# Patient Record
Sex: MALE | Race: WHITE | Employment: STUDENT | URBAN - METROPOLITAN AREA
[De-identification: names, ages, dates, MRNs, and addresses within clinical notes are randomized per-mention and may not be internally consistent; named-entity substitution may affect disease eponyms.]

---

## 2017-06-30 ENCOUNTER — THERAPY VISIT (OUTPATIENT)
Dept: CHIROPRACTIC MEDICINE | Facility: CLINIC | Age: 22
End: 2017-06-30
Payer: COMMERCIAL

## 2017-06-30 DIAGNOSIS — M25.561 RIGHT KNEE PAIN: ICD-10-CM

## 2017-06-30 DIAGNOSIS — M25.551 HIP PAIN, RIGHT: ICD-10-CM

## 2017-06-30 DIAGNOSIS — M99.05 SOMATIC DYSFUNCTION OF PELVIS REGION: Primary | ICD-10-CM

## 2017-06-30 DIAGNOSIS — M79.10 MYALGIA: ICD-10-CM

## 2017-06-30 PROCEDURE — 99203 OFFICE O/P NEW LOW 30 MIN: CPT | Performed by: CHIROPRACTOR

## 2017-06-30 ASSESSMENT — ACTIVITIES OF DAILY LIVING (ADL)
STIFFNESS: I HAVE THE SYMPTOM BUT IT DOES NOT AFFECT MY ACTIVITY
PAIN: THE SYMPTOM AFFECTS MY ACTIVITY MODERATELY
SWELLING: THE SYMPTOM AFFECTS MY ACTIVITY MODERATELY
WEAKNESS: THE SYMPTOM AFFECTS MY ACTIVITY SLIGHTLY
LIMPING: THE SYMPTOM AFFECTS MY ACTIVITY SLIGHTLY
GIVING WAY, BUCKLING OR SHIFTING OF KNEE: I DO NOT HAVE THE SYMPTOM

## 2017-06-30 NOTE — PROGRESS NOTES
[facility]  Jun 30, 2017    Subjective:  Wei Alfaro   21 year old   male    CC: Right posterior knee pain, R posterior hip pain,    Medications reviewed: allergy medication   Visit: 1/6  Goal: play high level baseball, decrease pain 50%, sit for 30 minutes without hip pain, run 30 minutes without knee pain  Location: Right posterior knee, R Posterior hip   VEL: chronic pain since 6/2016  Pain: 3/10 on average  Previous History: had some back pain in past when younger, notes pain in hip and knee for over 1 years, previous labral tear and shoulder managed conservatively   Progression: nothing has helped over last year   Quality: ache in knee, ache and snapping in hip   Radiation: denies  Pain is worse with: running, squatting, sitting for long periods  Pain is better with: rehab, rest  Timing: frequent pain   Under care: worked with PT for left shoulder, has worked with ATC at school for hip and knee. Things help for short term.   Imaging: MRI of knee was (-) and WNL according to him and father  Social: alert, oriented, and active.  at [place]       Objective:  Inspection:  No SDD  No Scars  Normal Gait  Right foot flare     Palpation:  No specific pain  Palpable soreness over R posterior knee, R posterior hip  Myofascitis 2/4 noted over R popliteus, R hip ER, R distal hamstring    ROM:  Lumbar flexion   90/90  Lumbar extension  30/30, mild lower back discomfort  Right Hip IR  2/45, post 16  Left Hip IR  32/45  Right Hip ER  58/60  Left  hip ER  52/60  Ankle DF limited B    MMT:  Left glute med 5/5 with no pain  Right glute med 4/5 with no pain  Left TFL 5/5 with no pain  Right TFL 4/5 with no pain  Left piriformis 5/5 with no pain  Right piriformis 4/5 with no pain    MET:  Right short leg  Right superior pubic bone  Right hip out flare    Squat:  Shift to right  Foot flare to right  Arm drop on left    Lunge:  Right knee genu valgum  Right knee cross over      NAL:  Restricted SI on right side    Neuro:  Able to toe walk and heel walk  L4-S1 light touch WNL     Ortho:  SLR (-), fader (pinching right hip), liliam (-)  Assessment:  NAL with associated myofascitis and weakness  Hip pain, discussed potential labral pathology and AURELIA  Knee pain    Plan:   Decrease pain 50%    Restore symmetric hip IR   Restore symmetric hip ER   Strengthen hip stabilizers to 5/5 B   Restore pelvic leveling   Restore segmental motion   Functional goals in history    Patient was given detailed history, review of symptoms, examination, functional examination, and report of findings. After this patient was treated with chiropractic adjustments, manual therapy, and therapeutic exercise. Patient tolerated treatment well. Patients treatment plan with be 2 times per week for 2 weeks followed by 1 time per week for 2 weeks. Following treatment plan a follow up exam will be done to make sure patient is improving. Treatment frequency will degrease as patients subjective complaints improve as well as objective findings. Prognosis for care is good based on fact that patient is active and is willing to take active approach in care.     Patient tolerated treatment well today  Treatment Time: 45 minutes  09805 manipulation 1-2 segments: MET, Hip LAD  17902 manipulation: Manual extremity  17583 Manual therapy: (ART, Graston, Strain Counter Strain, Fascial Manipulation, Cupping) performed over area of R hip ER, R VL, R popliteus   28704 therapeutic exercise (20 minutes): hip reverse clams X40 yellow band, side lying hip abduction   Strapping: hip IR and inferior distraction   Taping:  Next visit: couple weeks after summer baseball  Level 3 exam based on complexity       Donald Live DC, MEd, ATC

## 2017-08-01 ENCOUNTER — THERAPY VISIT (OUTPATIENT)
Dept: CHIROPRACTIC MEDICINE | Facility: CLINIC | Age: 22
End: 2017-08-01
Payer: COMMERCIAL

## 2017-08-01 DIAGNOSIS — M99.05 SOMATIC DYSFUNCTION OF PELVIS REGION: Primary | ICD-10-CM

## 2017-08-01 DIAGNOSIS — M79.10 MYALGIA: ICD-10-CM

## 2017-08-01 DIAGNOSIS — G89.29 CHRONIC PAIN OF RIGHT KNEE: ICD-10-CM

## 2017-08-01 DIAGNOSIS — M25.551 HIP PAIN, RIGHT: ICD-10-CM

## 2017-08-01 DIAGNOSIS — M25.561 CHRONIC PAIN OF RIGHT KNEE: ICD-10-CM

## 2017-08-01 PROCEDURE — 97110 THERAPEUTIC EXERCISES: CPT | Performed by: CHIROPRACTOR

## 2017-08-01 PROCEDURE — 98940 CHIROPRACT MANJ 1-2 REGIONS: CPT | Mod: AT | Performed by: CHIROPRACTOR

## 2017-08-01 NOTE — PROGRESS NOTES
[facility]  Jun 30, 2017    Subjective:  Wei Alfaro   21 year old   male    CC: Right posterior knee pain, R posterior hip pain,    Medications reviewed: allergy medication   Visit: 1/6  Goal: play high level baseball, decrease pain 50%, sit for 30 minutes without hip pain, run 30 minutes without knee pain  Location: Right posterior knee, R Posterior hip   VEL: chronic pain since 6/2016  Pain: 3/10 on average  Previous History: had some back pain in past when younger, notes pain in hip and knee for over 1 years, previous labral tear and shoulder managed conservatively   Progression: nothing has helped over last year   Quality: ache in knee, ache and snapping in hip   Radiation: denies  Pain is worse with: running, squatting, sitting for long periods  Pain is better with: rehab, rest  Timing: frequent pain   Under care: worked with PT for left shoulder, has worked with ATC at school for hip and knee. Things help for short term.   Imaging: MRI of knee was (-) and WNL according to him and father  Social: alert, oriented, and active.  at [employer]     Comes in today doing doing OK. Notes last time doing exercises 4-5 times per week. Notes running early on was pain free. I sent his ATC a letter with what we found but has not had any treatment since last session. He denies any new issues and was pleased with first session.       Objective:  Inspection:  No SDD  No Scars  Normal Gait  Right foot flare     Palpation:  No specific pain  Palpable soreness over R posterior knee, R posterior hip  Myofascitis 2/4 noted over R popliteus, R hip ER, R distal hamstring    ROM:  Lumbar flexion   90/90  Lumbar extension  30/30, mild lower back discomfort  Right Hip IR  2/45, post 16  Left Hip IR  32/45  Right Hip ER  58/60  Left  hip ER  52/60  Ankle DF limited B    MMT:  Left glute med 5/5 with no pain  Right glute med 4/5 with no pain  Left TFL 5/5 with no pain  Right TFL 4/5 with  no pain  Left piriformis 5/5 with no pain  Right piriformis 4/5 with no pain    MET:  Right short leg  Right superior pubic bone  Right hip out flare    Squat:  Shift to right  Foot flare to right  Arm drop on left    Lunge:  Right knee genu valgum  Right knee cross over     NAL:  Restricted SI on right side    Ortho:  SLR (-), fader (pinching right hip), liliam (-)  Assessment:  NAL with associated myofascitis and weakness  Hip pain, discussed potential labral pathology and AURELIA  Knee pain    Plan:  Patient tolerated treatment well today  Treatment Time: 45 minutes  45682 manipulation 1-2 segments: MET, Hip LAD  84351 manipulation: Manual extremity  50392 Manual therapy: (ART, Graston, Strain Counter Strain, Fascial Manipulation, Cupping) performed over area of R hip ER, R VL, R popliteus   09050 therapeutic exercise (20 minutes): hip reverse clams X40 yellow band, side lying hip abduction, straight leg marching bridges, hot salsa reach lunges   Strapping: hip IR and inferior distraction   Taping:  Next visit: 1 week  Dry Needle: R popliteus, R posterior hip (tolerated well)      Donald Live DC, MEd, ATC

## 2017-08-01 NOTE — MR AVS SNAPSHOT
After Visit Summary   8/1/2017    Wei Alfaro    MRN: [id number]           Patient Information     Date Of Birth          1995        Visit Information        Provider Department      8/1/2017 4:00 PM Donald Live DC [facility] [place] [facility]        Today's Diagnoses     Somatic dysfunction of pelvis region    -  1    Hip pain, right        Chronic pain of right knee        Myalgia           Follow-ups after your visit        Your next 10 appointments already scheduled     Aug 08, 2017  8:00 AM CDT   KARINA Chiropractor with Donald Live DC   [facility] [place] [facility] ([facility] [place])    [address] [place] [address]  [address]  [place] [address]   [phone]            Aug 11, 2017  8:00 AM CDT   KARINA Chiropractor with Donald Live DC   [facility] [place] [facility] ([facility] [place])    [address] [place] [address]  [address]  [place] [address]   [phone]            Aug 15, 2017  1:45 PM CDT   KARINA Chiropractor with Donald Live DC   [facility] [place] [facility] ([facility] [place])    [address] [place] [address]  [address]  [place] [address]   [phone]            Aug 18, 2017  1:00 PM CDT   KARINA Chiropractor with Donald Live DC   [facility] [place] [facility] ([facility] [place])    [address] [place] [address]  [address]  [place] [address]   [phone]            Aug 21, 2017  8:00 AM CDT   KARINA Chiropractor with Donald Live DC   [facility] [place] [facility] ([facility] [place])    [address] [place] [address]  [address]  [place] [address]   [phone]            Aug 25, 2017  8:00 AM CDT   KARINA Chiropractor with Donald Live DC   [facility] [place] [facility] ([facility] [place])    [address] [place] [address]  [address]  [place] [address] [phone]               Who to contact     If you have questions or need follow up information about today's clinic visit or your schedule please contact [facility] directly at [phone].  Normal or non-critical lab and imaging results will be communicated to you by MyChart, letter or phone within 4 business days after the clinic has received the results. If you do not hear from us within 7 days, please contact the clinic through MyChart or phone. If you have a critical or abnormal lab result, we will notify you by phone as soon as possible.  Submit refill requests through [email or web address] or call your pharmacy and they will forward the refill request to us. Please allow 3 business days for your refill to be completed.          Additional Information About Your Visit        [employer] Information     [email or web address] gives you secure access to your electronic health record. If you see a primary care provider, you can also send messages to your care team and make appointments. If you have questions, please call your primary care clinic.  If you do not have a primary care provider, please call [phone] and they will assist you.        Care EveryWhere ID     This is your Care EveryWhere ID. This could be used by other organizations to access your [facility] medical records  [id number]         Blood Pressure from Last 3 Encounters:   08/08/16 125/77   08/11/14 122/68   03/29/11 105/60    Weight from Last 3 Encounters:   08/08/16 100.2 kg (221 lb) (97 %)*   11/06/14 84.4 kg (186 lb) (89 %)*   08/11/14 84.4 kg (186 lb) (90 %)*     * Growth percentiles are based on CDC 2-20 Years data.              We Performed the Following     CHIROPRAC MANIP,SPINAL,1-2 REGIONS     THERAPEUTIC EXERCISES        Primary Care Provider    None Specified       No primary provider on file.        Equal Access to Services     STEPHANE VERDUGO : susu Lassiter qaybta kaalmada adeegyada, waxay idiin  clary anilvictor manuel satyajoan layuliaharjit ah. So [facility] [phone].    ATENCIÓN: Si habla mikiañol, tiene a mcpherson disposición servicios gratuitos de asistencia lingüística. Guerda al [phone].    We comply with applicable federal civil rights laws and [place] laws. We do not discriminate on the basis of race, color, national origin, age, disability sex, sexual orientation or gender identity.            Thank you!     Thank you for choosing [facility] CHIROPRACTOR  for your care. Our goal is always to provide you with excellent care. Hearing back from our patients is one way we can continue to improve our services. Please take a few minutes to complete the written survey that you may receive in the mail after your visit with us. Thank you!             Your Updated Medication List - Protect others around you: Learn how to safely use, store and throw away your medicines at www.disposemymeds.org.          This list is accurate as of: 8/1/17 11:59 PM.  Always use your most recent med list.                   Brand Name Dispense Instructions for use Diagnosis    CLARITIN-D 12 HOUR 5-120 MG per 12 hr tablet   Generic drug:  loratadine-pseudoePHEDrine      None Entered        NASONEX 50 MCG/ACT spray   Generic drug:  mometasone      None Entered        PATANOL OP           SINGULAIR 10 MG tablet   Generic drug:  montelukast      None Entered

## 2017-08-08 ENCOUNTER — THERAPY VISIT (OUTPATIENT)
Dept: CHIROPRACTIC MEDICINE | Facility: CLINIC | Age: 22
End: 2017-08-08
Payer: COMMERCIAL

## 2017-08-08 DIAGNOSIS — M79.10 MYALGIA: ICD-10-CM

## 2017-08-08 DIAGNOSIS — M25.551 HIP PAIN, RIGHT: ICD-10-CM

## 2017-08-08 DIAGNOSIS — M99.05 SOMATIC DYSFUNCTION OF PELVIS REGION: Primary | ICD-10-CM

## 2017-08-08 DIAGNOSIS — M25.561 CHRONIC PAIN OF RIGHT KNEE: ICD-10-CM

## 2017-08-08 DIAGNOSIS — G89.29 CHRONIC PAIN OF RIGHT KNEE: ICD-10-CM

## 2017-08-08 PROCEDURE — 98940 CHIROPRACT MANJ 1-2 REGIONS: CPT | Mod: AT | Performed by: CHIROPRACTOR

## 2017-08-08 PROCEDURE — 97110 THERAPEUTIC EXERCISES: CPT | Performed by: CHIROPRACTOR

## 2017-08-08 NOTE — MR AVS SNAPSHOT
After Visit Summary   8/8/2017    Wei Alfaro    MRN: [id number]           Patient Information     Date Of Birth          1995        Visit Information        Provider Department      8/8/2017 8:00 AM Donald Live DC [facility] [place] [facility]rop[facility]        Today's Diagnoses     Somatic dysfunction of pelvis region    -  1    Hip pain, right        Chronic pain of right knee        Myalgia           Follow-ups after your visit        Your next 10 appointments already scheduled     Aug 11, 2017  8:00 AM CDT   KARINA Chiropractor with Donald Live DC   [facility] [place] [facility] ([facility] [place])    [address] [place] [address]  [address]  [place] [address]   [phone]            Aug 15, 2017  1:45 PM CDT   KARINA Chiropractor with Donald Live DC   [facility] [place] [facility] ([facility] [place])    [address] [place] [address]  [address]  [place] [address]   [phone]            Aug 18, 2017  1:00 PM CDT   KARINA Chiropractor with Donald Live DC   [facility] [place] [facility] ([facility] [place])    [address] [facility]  [address]  [place] [address]   [phone]            Aug 21, 2017  8:00 AM CDT   KARINA Chiropractor with Donald Live DC   [facility] [place] [facility] ([facility] [place])    [address] [place] [address]  [address]  [place] [address]   [phone]            Aug 25, 2017  8:00 AM CDT   KARINA Chiropractor with Donald Live DC   [facility] [place] [facility] ([facility] [place])    [address] [place] [address]  [address]  [place] [address]   [phone]              Who to contact     If you have questions or need follow up information about today's clinic visit or your schedule please contact [facility] directly at [phone].  Normal or  non-critical lab and imaging results will be communicated to you by MyChart, letter or phone within 4 business days after the clinic has received the results. If you do not hear from us within 7 days, please contact the clinic through [employer] or phone. If you have a critical or abnormal lab result, we will notify you by phone as soon as possible.  Submit refill requests through [employer] or call your pharmacy and they will forward the refill request to us. Please allow 3 business days for your refill to be completed.          Additional Information About Your Visit        [employer] Information     [employer] gives you secure access to your electronic health record. If you see a primary care provider, you can also send messages to your care team and make appointments. If you have questions, please call your primary care clinic.  If you do not have a primary care provider, please call [phone] and they will assist you.        Care EveryWhere ID     This is your Care EveryWhere ID. This could be used by other organizations to access your [place] medical records  [id number]         Blood Pressure from Last 3 Encounters:   08/08/16 125/77   08/11/14 122/68   03/29/11 105/60    Weight from Last 3 Encounters:   08/08/16 100.2 kg (221 lb) (97 %)*   11/06/14 84.4 kg (186 lb) (89 %)*   08/11/14 84.4 kg (186 lb) (90 %)*     * Growth percentiles are based on [facility] 2-20 Years data.              We Performed the Following     CHIROPRAC MANIP,SPINAL,1-2 REGIONS     THERAPEUTIC EXERCISES        Primary Care Provider    None Specified       No primary provider on file.        Equal Access to Services     [facility] Center[facility] : Hadrobert Sprague, wanyasiada lushyann, qaybta kaalmada sindhu, paulette gardner . So [facility] [phone].    ATENCIÓN: Si habla español, tiene a mcpherson disposición servicios gratuitos de asistencia lingüística. Llame al [phone].    We comply with applicable federal civil rights laws  and [place] laws. We do not discriminate on the basis of race, color, national origin, age, disability sex, sexual orientation or gender identity.            Thank you!     Thank you for choosing [facility]  for your care. Our goal is always to provide you with excellent care. Hearing back from our patients is one way we can continue to improve our services. Please take a few minutes to complete the written survey that you may receive in the mail after your visit with us. Thank you!             Your Updated Medication List - Protect others around you: Learn how to safely use, store and throw away your medicines at www.disposemymeds.org.          This list is accurate as of: 8/8/17  9:35 AM.  Always use your most recent med list.                   Brand Name Dispense Instructions for use Diagnosis    CLARITIN-D 12 HOUR 5-120 MG per 12 hr tablet   Generic drug:  loratadine-pseudoePHEDrine      None Entered        NASONEX 50 MCG/ACT spray   Generic drug:  mometasone      None Entered        PATANOL OP           SINGULAIR 10 MG tablet   Generic drug:  montelukast      None Entered

## 2017-08-08 NOTE — PROGRESS NOTES
[facility]  Jun 30, 2017    Subjective:  Wei Alfaro   21 year old   male    CC: Right posterior knee pain, R posterior hip pain,    Medications reviewed: allergy medication   Visit: 3/6  Goal: play high level baseball, decrease pain 50%, sit for 30 minutes without hip pain, run 30 minutes without knee pain  Location: Right posterior knee, R Posterior hip   VEL: chronic pain since 6/2016  Pain: 3/10 on average  Previous History: had some back pain in past when younger, notes pain in hip and knee for over 1 years, previous labral tear and shoulder managed conservatively   Progression: nothing has helped over last year   Quality: ache in knee, ache and snapping in hip   Radiation: denies  Pain is worse with: running, squatting, sitting for long periods  Pain is better with: rehab, rest  Timing: frequent pain   Under care: worked with PT for left shoulder, has worked with ATC at school for hip and knee. Things help for short term.   Imaging: MRI of knee was (-) and WNL according to him and father  Social: alert, oriented, and active.  at [employer]   Comes in today doing great. Notes improvement. Working on active care and pleased with progress.       Objective:  Inspection:  No SDD  No Scars  Normal Gait  Right foot flare     Palpation:  No specific pain  Palpable soreness over R posterior knee, R posterior hip  Myofascitis 2/4 noted over R popliteus, R hip ER, R distal hamstring    ROM:  Lumbar flexion   90/90  Lumbar extension  30/30, mild lower back discomfort  Right Hip IR  2/45, post 27  Left Hip IR  32/45  Right Hip ER  58/60  Left  hip ER  52/60  Ankle DF limited B    MMT:  Left glute med 5/5 with no pain  Right glute med 4/5 with no pain  Left TFL 5/5 with no pain  Right TFL 4/5 with no pain  Left piriformis 5/5 with no pain  Right piriformis 4/5 with no pain    MET:  Right short leg  Right superior pubic bone  Right hip out flare    Squat:  Shift to  right  Foot flare to right  Arm drop on left    Lunge:  Right knee genu valgum  Right knee cross over     NAL:  Restricted SI on right side    Ortho:  SLR (-), fader (pinching right hip), liliam (-)  Assessment:  NAL with associated myofascitis and weakness  Hip pain, discussed potential labral pathology and AURELIA  Knee pain    Plan:  Patient tolerated treatment well today  Treatment Time: 45 minutes  57677 manipulation 1-2 segments: MET, Hip LAD  25829 manipulation: Manual extremity  06659 Manual therapy: (ART, Graston, Strain Counter Strain, Fascial Manipulation, Cupping) performed over area of R hip ER, R VL, R popliteus   22706 therapeutic exercise (20 minutes): hip reverse clams X40 yellow band, side lying hip abduction, straight leg marching bridges, hot salsa reach lunges   Strapping: hip IR and inferior distraction, kneeling posterior glide back and forth, figure 4 lateral glide  Taping:  Next visit: 1 week  Dry Needle: R popliteus, R posterior hip (tolerated well)      Donald Live DC, MEd, ATC

## 2017-08-11 ENCOUNTER — THERAPY VISIT (OUTPATIENT)
Dept: CHIROPRACTIC MEDICINE | Facility: CLINIC | Age: 22
End: 2017-08-11
Payer: COMMERCIAL

## 2017-08-11 DIAGNOSIS — G89.29 CHRONIC PAIN OF RIGHT KNEE: ICD-10-CM

## 2017-08-11 DIAGNOSIS — M99.05 SOMATIC DYSFUNCTION OF PELVIS REGION: Primary | ICD-10-CM

## 2017-08-11 DIAGNOSIS — M79.10 MYALGIA: ICD-10-CM

## 2017-08-11 DIAGNOSIS — M25.561 CHRONIC PAIN OF RIGHT KNEE: ICD-10-CM

## 2017-08-11 DIAGNOSIS — M25.551 HIP PAIN, RIGHT: ICD-10-CM

## 2017-08-11 PROCEDURE — 98940 CHIROPRACT MANJ 1-2 REGIONS: CPT | Mod: AT | Performed by: CHIROPRACTOR

## 2017-08-11 PROCEDURE — 97110 THERAPEUTIC EXERCISES: CPT | Performed by: CHIROPRACTOR

## 2017-08-11 NOTE — PROGRESS NOTES
[facility] [facility]  Jun 30, 2017    Subjective:  Wei Alfaro   21 year old   male    CC: Right posterior knee pain, R posterior hip pain,    Medications reviewed: allergy medication   Visit: 4/6  Goal: play high level baseball, decrease pain 50%, sit for 30 minutes without hip pain, run 30 minutes without knee pain  Location: Right posterior knee, R Posterior hip   VEL: chronic pain since 6/2016  Pain: 3/10 on average  Previous History: had some back pain in past when younger, notes pain in hip and knee for over 1 years, previous labral tear and shoulder managed conservatively   Progression: nothing has helped over last year   Quality: ache in knee, ache and snapping in hip   Radiation: denies  Pain is worse with: running, squatting, sitting for long periods  Pain is better with: rehab, rest  Timing: frequent pain   Under care: worked with PT for left shoulder, has worked with ATC at school for hip and knee. Things help for short term.   Imaging: MRI of knee was (-) and WNL according to him and father  Social: alert, oriented, and active.  at [employer]   Comes in today doing well. Notes that working on hip mobility at home. Denies any new issues. Working out and running 2-4 miles. Notes knee and hips is doing well. Working on active care program. Very pleased with progress. Denies any new issues.       Objective:  Inspection:  No SDD  No Scars  Normal Gait  Right foot flare     Palpation:  No specific pain  Palpable soreness over R posterior knee, R posterior hip  Myofascitis 2/4 noted over R popliteus, R hip ER, R distal hamstring    ROM:  Lumbar flexion   90/90  Lumbar extension  30/30, mild lower back discomfort  Right Hip IR  2/45, post 27  Left Hip IR  32/45  Right Hip ER  58/60  Left  hip ER  52/60  Ankle DF limited B    MMT:  Left glute med 5/5 with no pain  Right glute med 4/5 with no pain  Left TFL 5/5 with no pain  Right TFL 4/5 with no pain  Left piriformis 5/5  with no pain  Right piriformis 4/5 with no pain    MET:  Right short leg  Right superior pubic bone  Right hip out flare    Squat:  Shift to right  Foot flare to right  Arm drop on left    Lunge:  Right knee genu valgum  Right knee cross over     NAL:  Restricted SI on right side    Ortho:  SLR (-), fader (pinching right hip), liliam (-)  Assessment:  NAL with associated myofascitis and weakness  Hip pain, discussed potential labral pathology and AURELIA  Knee pain    Plan:  Patient tolerated treatment well today  Treatment Time: 45 minutes  78781 manipulation 1-2 segments: MET, Hip LAD  63970 manipulation: Manual extremity  04707 Manual therapy: (ART, Graston, Strain Counter Strain, Fascial Manipulation, Cupping) performed over area of R hip ER, R VL, R popliteus   78935 therapeutic exercise (20 minutes): hip reverse clams X40 yellow band, side lying hip abduction, straight leg marching bridges, hot salsa reach lunges, 3 way mobility sequence    Strapping: hip IR and inferior distraction, kneeling posterior glide back and forth, figure 4 lateral glide  Taping:  Next visit: 1 week  Dry Needle: R popliteus, R posterior hip (tolerated well)      Donald Live DC, MEd, ATC

## 2017-08-11 NOTE — MR AVS SNAPSHOT
After Visit Summary   8/11/2017    Wei Alfaro    MRN: [id number]           Patient Information     Date Of Birth          1995        Visit Information        Provider Department      8/11/2017 8:00 AM Donald Live DC [facility] Athletic [facility] [place] Chiropractor        Today's Diagnoses     Somatic dysfunction of pelvis region    -  1    Hip pain, right        Chronic pain of right knee        Myalgia           Follow-ups after your visit        Your next 10 appointments already scheduled     Aug 15, 2017  1:45 PM CDT   KARINA Chiropractor with Donald Live DC   [facility] [place] [facility] ([facility] [place])    [address] [place] [address]  [address]  [place] [address]   [phone]            Aug 18, 2017  1:00 PM CDT   KARINA Chiropractor with Donald Live DC   [facility] [place] [facility] ([facility] [place])    [address] [place] [address]  [address]  [place] [address]   [phone]            Aug 21, 2017  8:00 AM CDT   KARINA Chiropractor with Donald Live DC   [facility] Athletic [facility] [place] Chiropractor ([facility] [place])    [address] [place] [address]  [address]  [place] [address]   [phone]            Aug 25, 2017  8:00 AM CDT   KARINA Chiropractor with Donald Live DC   [facility] Athletic [facility] [place] Chiropractor ([facility] [place])    [address] [place] [address]  [address]  [place] [address]   [phone]              Who to contact     If you have questions or need follow up information about today's clinic visit or your schedule please contact [facility] directly at [phone].  Normal or non-critical lab and imaging results will be communicated to you by MyChart, letter or phone within 4 business days after the clinic has received the results. If you do not hear from us within 7 days, please contact the clinic through  Wattagehart or phone. If you have a critical or abnormal lab result, we will notify you by phone as soon as possible.  Submit refill requests through [employer] or call your pharmacy and they will forward the refill request to us. Please allow 3 business days for your refill to be completed.          Additional Information About Your Visit        Wattagehart Information     [employer] gives you secure access to your electronic health record. If you see a primary care provider, you can also send messages to your care team and make appointments. If you have questions, please call your primary care clinic.  If you do not have a primary care provider, please call [phone] and they will assist you.        Care EveryWhere ID     This is your Care EveryWhere ID. This could be used by other organizations to access your [place] medical records  [id number]         Blood Pressure from Last 3 Encounters:   08/08/16 125/77   08/11/14 122/68   03/29/11 105/60    Weight from Last 3 Encounters:   08/08/16 100.2 kg (221 lb) (97 %)*   11/06/14 84.4 kg (186 lb) (89 %)*   08/11/14 84.4 kg (186 lb) (90 %)*     * Growth percentiles are based on [facility] 2-20 Years data.              We Performed the Following     CHIROPRAC MANIP,SPINAL,1-2 REGIONS     THERAPEUTIC EXERCISES        Primary Care Provider    None Specified       No primary provider on file.        Equal Access to Services     STEPHANE VERDUGO : Hadii adrienne steel hadasho Somichelleali, waaxda luqadaha, qaybta kaalmada sindhu, paulette roberto. So [facility] [phone].    ATENCIÓN: Si habla español, tiene a mcpherson disposición servicios gratuitos de asistencia lingüística. Guerda al [phone].    We comply with applicable federal civil rights laws and [place] laws. We do not discriminate on the basis of race, color, national origin, age, disability sex, sexual orientation or gender identity.            Thank you!     Thank you for choosing [facility]  for your care. Our goal is always to provide you with excellent care. Hearing back from our patients is one way we can continue to improve our services. Please take a few minutes to complete the written survey that you may receive in the mail after your visit with us. Thank you!             Your Updated Medication List - Protect others around you: Learn how to safely use, store and throw away your medicines at www.disposemymeds.org.          This list is accurate as of: 8/11/17 11:59 PM.  Always use your most recent med list.                   Brand Name Dispense Instructions for use Diagnosis    CLARITIN-D 12 HOUR 5-120 MG per 12 hr tablet   Generic drug:  loratadine-pseudoePHEDrine      None Entered        NASONEX 50 MCG/ACT spray   Generic drug:  mometasone      None Entered        PATANOL OP           SINGULAIR 10 MG tablet   Generic drug:  montelukast      None Entered

## 2017-08-15 ENCOUNTER — THERAPY VISIT (OUTPATIENT)
Dept: CHIROPRACTIC MEDICINE | Facility: CLINIC | Age: 22
End: 2017-08-15
Payer: COMMERCIAL

## 2017-08-15 DIAGNOSIS — M25.551 HIP PAIN, RIGHT: ICD-10-CM

## 2017-08-15 DIAGNOSIS — G89.29 CHRONIC PAIN OF RIGHT KNEE: ICD-10-CM

## 2017-08-15 DIAGNOSIS — M99.05 SOMATIC DYSFUNCTION OF PELVIS REGION: ICD-10-CM

## 2017-08-15 DIAGNOSIS — M25.561 CHRONIC PAIN OF RIGHT KNEE: ICD-10-CM

## 2017-08-15 DIAGNOSIS — M79.10 MYALGIA: ICD-10-CM

## 2017-08-15 PROCEDURE — 98940 CHIROPRACT MANJ 1-2 REGIONS: CPT | Mod: AT | Performed by: CHIROPRACTOR

## 2017-08-15 PROCEDURE — 97110 THERAPEUTIC EXERCISES: CPT | Performed by: CHIROPRACTOR

## 2017-08-15 NOTE — PROGRESS NOTES
[facility]  Jun 30, 2017    Subjective:  Wei Alfaro   21 year old   male    CC: Right posterior knee pain, R posterior hip pain,    Medications reviewed: allergy medication   Visit: 6  Goal: play high level baseball, decrease pain 50%, sit for 30 minutes without hip pain, run 30 minutes without knee pain  Location: Right posterior knee, R Posterior hip   VEL: chronic pain since 6/2016  Pain: 3/10 on average  Previous History: had some back pain in past when younger, notes pain in hip and knee for over 1 years, previous labral tear and shoulder managed conservatively   Progression: nothing has helped over last year   Quality: ache in knee, ache and snapping in hip   Radiation: denies  Pain is worse with: running, squatting, sitting for long periods  Pain is better with: rehab, rest  Timing: frequent pain   Comes in today doing very good. Notes no pain. He is going to be heading back to school. He is very pleased with progress. Denies any new issues.         Objective:  Inspection:  No SDD  No Scars  Normal Gait  Right foot flare     Palpation:  No specific pain  Palpable soreness over R posterior knee, R posterior hip  Myofascitis 2/4 noted over R popliteus, R hip ER, R distal hamstring    ROM:  Lumbar flexion   90/90  Lumbar extension  30/30, no pain  Right Hip IR  29/45  Left Hip IR  32/45  Right Hip ER  58/60  Left  hip ER  52/60  Ankle DF limited B    MMT:  Left glute med 5/5 with no pain  Right glute med 4/5 with no pain  Left TFL 5/5 with no pain  Right TFL 4/5 with no pain  Left piriformis 5/5 with no pain  Right piriformis 4/5 with no pain    MET:  Right short leg  Right superior pubic bone  Right hip out flare    Squat:  Shift to right  Foot flare to right  Arm drop on left    Lunge:  Right knee genu valgum  Right knee cross over     NAL:  Restricted SI on right side    Ortho:  SLR (-), fader (pinching right hip), liliam (-)  Assessment:  NAL with associated myofascitis  and weakness  Hip pain, discussed potential labral pathology and AURELIA  Knee pain    Plan:  Patient tolerated treatment well today  Treatment Time: 45 minutes  59201 manipulation 1-2 segments: MET, Hip LAD  55413 manipulation: Manual extremity  87223 Manual therapy: (ART, Graston, Strain Counter Strain, Fascial Manipulation, Cupping) performed over area of R hip ER, R VL, R popliteus   48869 therapeutic exercise (20 minutes): hip reverse clams X40 yellow band, side lying hip abduction, straight leg marching bridges, hot salsa reach lunges, 3 way mobility sequence, kneeling hip IR in standing position and in quad position   Strapping: hip IR and inferior distraction, kneeling posterior glide back and forth, figure 4 lateral glide  Taping:  Next visit: PRN  Dry Needle: R popliteus, R posterior hip (did not do)      Donald Live DC, MEd, ATC

## 2017-08-15 NOTE — MR AVS SNAPSHOT
After Visit Summary   8/15/2017    eWi Alfaro    MRN: [id number]           Patient Information     Date Of Birth          1995        Visit Information        Provider Department      8/15/2017 1:45 PM Donald Live DC [facility] [place] Chiropractor        Today's Diagnoses     Somatic dysfunction of pelvis region        Hip pain, right        Chronic pain of right knee        Myalgia           Follow-ups after your visit        Your next 10 appointments already scheduled     Aug 21, 2017  8:00 AM CDT   KARINA Chiropractor with Donald Live DC   [facility] [place] [facility] ([facility] [place])    [address] [place] [address]  [address]  [place] [address]   [phone]            Aug 25, 2017  8:00 AM CDT   [facility] Chiropractor with Donald Live DC   [facility] Athletic [facility] [place] Chiropractor ([facility] [place])    [address] [place] [address]  [address]  [place] [address]   [phone]              Who to contact     If you have questions or need follow up information about today's clinic visit or your schedule please contact [facility] directly at [phone].  Normal or non-critical lab and imaging results will be communicated to you by LeadiDhart, letter or phone within 4 business days after the clinic has received the results. If you do not hear from us within 7 days, please contact the clinic through LeadiDhart or phone. If you have a critical or abnormal lab result, we will notify you by phone as soon as possible.  Submit refill requests through [employer] or call your pharmacy and they will forward the refill request to us. Please allow 3 business days for your refill to be completed.          Additional Information About Your Visit        LeadiDhart Information     [employer] gives you secure access to your electronic health record. If you see a primary care provider, you can  also send messages to your care team and make appointments. If you have questions, please call your primary care clinic.  If you do not have a primary care provider, please call [phone] and they will assist you.        Care EveryWhere ID     This is your Care EveryWhere ID. This could be used by other organizations to access your [place] medical records  [id number]         Blood Pressure from Last 3 Encounters:   08/08/16 125/77   08/11/14 122/68   03/29/11 105/60    Weight from Last 3 Encounters:   08/08/16 100.2 kg (221 lb) (97 %)*   11/06/14 84.4 kg (186 lb) (89 %)*   08/11/14 84.4 kg (186 lb) (90 %)*     * Growth percentiles are based on [facility] 2-20 Years data.              We Performed the Following     CHIROPRAC MANIP,SPINAL,1-2 REGIONS     THERAPEUTIC EXERCISES        Primary Care Provider    None Specified       No primary provider on file.        Equal Access to Services     STEPHANE VERDUGO : Ely Sprague, susu purcell, perez manley, paulette gardner . So [facility] [phone].    ATENCIÓN: Si tacola mark, tiene a mcpherson disposición servicios gratuitos de asistencia lingüística. Llame al [phone].    We comply with applicable federal civil rights laws and [place] laws. We do not discriminate on the basis of race, color, national origin, age, disability sex, sexual orientation or gender identity.            Thank you!     Thank you for choosing [facility] CHIROPRACTOR  for your care. Our goal is always to provide you with excellent care. Hearing back from our patients is one way we can continue to improve our services. Please take a few minutes to complete the written survey that you may receive in the mail after your visit with us. Thank you!             Your Updated Medication List - Protect others around you: Learn how to safely use, store and throw away your medicines at www.disposemymeds.org.          This  list is accurate as of: 8/15/17 11:59 PM.  Always use your most recent med list.                   Brand Name Dispense Instructions for use Diagnosis    CLARITIN-D 12 HOUR 5-120 MG per 12 hr tablet   Generic drug:  loratadine-pseudoePHEDrine      None Entered        NASONEX 50 MCG/ACT spray   Generic drug:  mometasone      None Entered        PATANOL OP           SINGULAIR 10 MG tablet   Generic drug:  montelukast      None Entered

## 2017-08-18 ENCOUNTER — THERAPY VISIT (OUTPATIENT)
Dept: CHIROPRACTIC MEDICINE | Facility: CLINIC | Age: 22
End: 2017-08-18
Payer: COMMERCIAL

## 2017-08-18 DIAGNOSIS — M25.561 CHRONIC PAIN OF RIGHT KNEE: ICD-10-CM

## 2017-08-18 DIAGNOSIS — M99.05 SOMATIC DYSFUNCTION OF PELVIS REGION: Primary | ICD-10-CM

## 2017-08-18 DIAGNOSIS — M79.10 MYALGIA: ICD-10-CM

## 2017-08-18 DIAGNOSIS — M25.551 HIP PAIN, RIGHT: ICD-10-CM

## 2017-08-18 DIAGNOSIS — G89.29 CHRONIC PAIN OF RIGHT KNEE: ICD-10-CM

## 2017-08-18 PROCEDURE — 97110 THERAPEUTIC EXERCISES: CPT | Performed by: CHIROPRACTOR

## 2017-08-18 PROCEDURE — 98940 CHIROPRACT MANJ 1-2 REGIONS: CPT | Mod: AT | Performed by: CHIROPRACTOR

## 2017-08-18 NOTE — MR AVS SNAPSHOT
After Visit Summary   8/18/2017    Wei Alfaro    MRN: [id number]           Patient Information     Date Of Birth          1995        Visit Information        Provider Department      8/18/2017 1:00 PM Donald Live DC [facility] [place] Chiropractor        Today's Diagnoses     Somatic dysfunction of pelvis region    -  1    Hip pain, right        Chronic pain of right knee        Myalgia           Follow-ups after your visit        Your next 10 appointments already scheduled     Aug 21, 2017  8:00 AM CDT   KARINA Chiropractor with Donald Live DC   [facility] [place] [facility] ([facility] [place])    [address] [place] [address]  [address]  [place] [address]   [phone]            Aug 25, 2017  8:00 AM CDT   KARINA Chiropractor with Donald Live DC   [facility] [place] [facility] ([facility] [place])    [address] [place] [address]  [address]  [place] [address]   [phone]              Who to contact     If you have questions or need follow up information about today's clinic visit or your schedule please contact [facility] [place] [facility] directly at [phone].  Normal or non-critical lab and imaging results will be communicated to you by Qalendrahart, letter or phone within 4 business days after the clinic has received the results. If you do not hear from us within 7 days, please contact the clinic through Qalendrahart or phone. If you have a critical or abnormal lab result, we will notify you by phone as soon as possible.  Submit refill requests through [employer] or call your pharmacy and they will forward the refill request to us. Please allow 3 business days for your refill to be completed.          Additional Information About Your Visit        Qalendrahart Information     [employer] gives you secure access to your electronic health record. If you see a primary care provider, you  can also send messages to your care team and make appointments. If you have questions, please call your primary care clinic.  If you do not have a primary care provider, please call [phone] and they will assist you.        Care EveryWhere ID     This is your Care EveryWhere ID. This could be used by other organizations to access your [place] medical records  [id number]         Blood Pressure from Last 3 Encounters:   08/08/16 125/77   08/11/14 122/68   03/29/11 105/60    Weight from Last 3 Encounters:   08/08/16 100.2 kg (221 lb) (97 %)*   11/06/14 84.4 kg (186 lb) (89 %)*   08/11/14 84.4 kg (186 lb) (90 %)*     * Growth percentiles are based on [facility] 2-20 Years data.              We Performed the Following     CHIROPRAC MANIP,SPINAL,1-2 REGIONS     THERAPEUTIC EXERCISES        Primary Care Provider    None Specified       No primary provider on file.        Equal Access to Services     STEPHANE VERDUGO : Hadrobert Sprague, susu purcell, perez kaalmabelem manley, paulette gardner . So [facility] [phone].    ATENCIÓN: Si grace flores, tiene a mcpherson disposición servicios gratuitos de asistencia lingüística. Llame al [phone].    We comply with applicable federal civil rights laws and [place] laws. We do not discriminate on the basis of race, color, national origin, age, disability sex, sexual orientation or gender identity.            Thank you!     Thank you for choosing [facility] CHIROPRACTOR  for your care. Our goal is always to provide you with excellent care. Hearing back from our patients is one way we can continue to improve our services. Please take a few minutes to complete the written survey that you may receive in the mail after your visit with us. Thank you!             Your Updated Medication List - Protect others around you: Learn how to safely use, store and throw away your medicines at www.disposemymeds.org.          This  list is accurate as of: 8/18/17  2:25 PM.  Always use your most recent med list.                   Brand Name Dispense Instructions for use Diagnosis    CLARITIN-D 12 HOUR 5-120 MG per 12 hr tablet   Generic drug:  loratadine-pseudoePHEDrine      None Entered        NASONEX 50 MCG/ACT spray   Generic drug:  mometasone      None Entered        PATANOL OP           SINGULAIR 10 MG tablet   Generic drug:  montelukast      None Entered

## 2017-08-18 NOTE — PROGRESS NOTES
[place] [facility]  Jun 30, 2017    Subjective:  Wei Alfaro   21 year old   male    CC: Right posterior knee pain, R posterior hip pain,    Medications reviewed: allergy medication   Visit: 6  Goal: play high level baseball, decrease pain 50%, sit for 30 minutes without hip pain, run 30 minutes without knee pain  Location: Right posterior knee, R Posterior hip   Comes in today doing very well. He is working on active care program. He has no pain with ADL's and he is doing some running with no pain. He is very pleased. He denies any new issues. Will be heading back to school and we will keep in touch and talk with his ATC's to help him continue to improve.     Objective:  Inspection:  No SDD  No Scars  Normal Gait  Right foot flare     Palpation:  No specific pain  Palpable soreness over R posterior knee, R posterior hip  Myofascitis 2/4 noted over R popliteus, R hip ER, R distal hamstring    ROM:  Lumbar flexion   90/90  Lumbar extension  30/30, no pain  Right Hip IR  29/45,   Left Hip IR  32/45  Right Hip ER  58/60  Left  hip ER  52/60  Ankle DF limited B    MMT:  Left glute med 5/5 with no pain  Right glute med 4/5 with no pain  Left TFL 5/5 with no pain  Right TFL 4/5 with no pain  Left piriformis 5/5 with no pain  Right piriformis 4/5 with no pain    Squat:  Shift to right  Foot flare to right  Arm drop on left    Lunge:  Right knee genu valgum  Right knee cross over     NAL:  Restricted SI on right side    Ortho:  SLR (-), fader (pinching right hip), liliam (-)  Assessment:  NAL with associated myofascitis and weakness  Hip pain, discussed potential labral pathology and AURELIA  Knee pain    Plan:  Patient tolerated treatment well today  Treatment Time: 45 minutes  14763 manipulation 1-2 segments: MET, Hip LAD  62249 manipulation: Manual extremity  78089 Manual therapy: (ART, Graston, Strain Counter Strain, Fascial Manipulation, Cupping) performed over area of R hip ER, R VL, R  popliteus   35007 therapeutic exercise (20 minutes): hip reverse clams X40 yellow band, side lying hip abduction, straight leg marching bridges, hot salsa reach lunges, 3 way mobility sequence    Standing single leg hip IR swivel with green band, prone quad single leg IR swivel, side lying hip abduction against wall, single leg step back   Strapping: hip IR and inferior distraction, kneeling posterior glide back and forth, figure 4 lateral glide  Taping:  Next visit: 1 week  Dry Needle: R popliteus, R posterior hip (tolerated well)      Donald Live DC, MEd, ATC

## 2017-10-22 ENCOUNTER — HEALTH MAINTENANCE LETTER (OUTPATIENT)
Age: 22
End: 2017-10-22

## 2018-06-08 ENCOUNTER — THERAPY VISIT (OUTPATIENT)
Dept: CHIROPRACTIC MEDICINE | Facility: CLINIC | Age: 23
End: 2018-06-08

## 2018-06-08 DIAGNOSIS — M79.10 MYALGIA: ICD-10-CM

## 2018-06-08 DIAGNOSIS — M25.562 LEFT KNEE PAIN, UNSPECIFIED CHRONICITY: Primary | ICD-10-CM

## 2018-06-08 DIAGNOSIS — M99.05 SOMATIC DYSFUNCTION OF PELVIS REGION: ICD-10-CM

## 2018-06-08 DIAGNOSIS — M25.551 HIP PAIN, RIGHT: ICD-10-CM

## 2018-06-08 PROCEDURE — 97110 THERAPEUTIC EXERCISES: CPT | Performed by: CHIROPRACTOR

## 2018-06-08 PROCEDURE — 98940 CHIROPRACT MANJ 1-2 REGIONS: CPT | Mod: AT | Performed by: CHIROPRACTOR

## 2018-06-08 PROCEDURE — 99211 OFF/OP EST MAY X REQ PHY/QHP: CPT | Mod: 25 | Performed by: CHIROPRACTOR

## 2018-06-08 NOTE — PROGRESS NOTES
[facility]    Subjective:  Wei Alfaro   22 year old   male    CC: Left posterior knee pain, R posterior hip pain,    Medications reviewed: allergy medication   Visit: 1 (re-exam)  Goal: play high level baseball, decrease pain 50%, sit for 30 minutes without hip pain, run 30 minutes without knee pain  Location: L posterior knee, R Posterior hip   Comes in today doing well. Notes after last session felt great over right knee and had very good baseball season. Notes end of the baseball season started to have some left posterior knee pain to similar to what he felt in his left. Denies any VEL. Denies any swelling. Notes pain over last 2 months and worse with running for standing for long periods. Pain is 3/10. Notes that rest and stretching help. Did have it treated with school ATC. Denies any other changes in health history. Notes B hip tightness R>L.     Objective:  Inspection:  No SDD  No Scars  Normal Gait  Right foot flare     Palpation:  No specific pain  Palpable soreness over L posterior knee, R posterior hip  Myofascitis 2/4 noted over L popliteus, R hip ER, L distal hamstring    ROM:  Lumbar flexion   90/90  Lumbar extension  30/30, no pain  Right Hip IR  29/45,   Left Hip IR  32/45  Right Hip ER  58/60  Left  hip ER  52/60  Ankle DF limited B  SLR full with tightness noted over left posterior knee  Able to deep squat with no pain    MMT:  Left glute med 5/5 with no pain  Right glute med 4/5 with no pain  Left TFL 5/5 with no pain  Right TFL 4/5 with no pain  Left piriformis 4/5 with no pain  Right piriformis 4/5 with no pain    Squat:  Shift to right  Foot flare to right  Arm drop on left    Lunge:  Right knee genu valgum  Right knee cross over     NAL:  Restricted SI on right side    Ortho:  SLR (-), fader (pinching right hip), liliam (-), bounce home (-), Lars (-)    Assessment:  NAL with associated myofascitis and weakness  Hip pain, discussed potential labral  pathology and AURELIA  Knee pain    Plan:  Patient tolerated treatment well today  Treatment Time: 45 minutes  42056 manipulation 1-2 segments: MET, Hip LAD  45611 Manual therapy: (ART, Graston, Strain Counter Strain, Fascial Manipulation, Cupping) performed over area of R hip ER, L VL, L popliteus , L calf, L hamstring  84251 therapeutic exercise (20 minutes): hip reverse clams X40 yellow band, side lying hip abduction, straight leg marching bridges, hot salsa reach lunges, 3 way mobility sequence   Standing single leg hip IR swivel with green band, prone quad single leg IR swivel, side lying hip abduction against wall, single leg step back   Today: quad stretching, review or current exercises   Strapping: hip IR and inferior distraction, kneeling posterior glide back and forth, figure 4 lateral glide  Taping:  Plan: 4 visits over next 6 weeks  Next visit: 1 week  Dry Needle: R popliteus, R posterior hip did not do today)      Donald Live DC, MEd, ATC

## 2018-06-10 PROBLEM — M25.562 LEFT KNEE PAIN, UNSPECIFIED CHRONICITY: Status: ACTIVE | Noted: 2018-06-10

## 2018-06-18 ENCOUNTER — THERAPY VISIT (OUTPATIENT)
Dept: CHIROPRACTIC MEDICINE | Facility: CLINIC | Age: 23
End: 2018-06-18

## 2018-06-18 DIAGNOSIS — M25.551 HIP PAIN, RIGHT: ICD-10-CM

## 2018-06-18 DIAGNOSIS — M79.10 MYALGIA: ICD-10-CM

## 2018-06-18 DIAGNOSIS — M25.562 LEFT KNEE PAIN, UNSPECIFIED CHRONICITY: ICD-10-CM

## 2018-06-18 DIAGNOSIS — M99.05 SOMATIC DYSFUNCTION OF PELVIS REGION: Primary | ICD-10-CM

## 2018-06-18 PROCEDURE — 97110 THERAPEUTIC EXERCISES: CPT | Performed by: CHIROPRACTOR

## 2018-06-18 PROCEDURE — 98940 CHIROPRACT MANJ 1-2 REGIONS: CPT | Mod: AT | Performed by: CHIROPRACTOR

## 2018-06-18 NOTE — PROGRESS NOTES
[facility]    Subjective:  Wei Alfaro   22 year old   male    CC: Left posterior knee pain, R posterior hip pain,    Medications reviewed: allergy medication   Visit: 2  Goal: play high level baseball, decrease pain 50%, sit for 30 minutes without hip pain, run 30 minutes without knee pain  Location: L posterior knee, R Posterior hip   Comes in today doing well. Tolerated last session.     Objective:  Inspection:  No SDD  No Scars  Normal Gait  Right foot flare     Palpation:  No specific pain  Palpable soreness over L posterior knee, R posterior hip  Myofascitis 2/4 noted over L popliteus, R hip ER, L distal hamstring    ROM:  Lumbar flexion   90/90  Lumbar extension  30/30, no pain  Right Hip IR  29/45,   Left Hip IR  32/45  Right Hip ER  58/60  Left  hip ER  52/60  Ankle DF limited B  SLR full with tightness noted over left posterior knee  Able to deep squat with no pain    MMT:  Left glute med 5/5 with no pain  Right glute med 4/5 with no pain  Left TFL 5/5 with no pain  Right TFL 4/5 with no pain  Left piriformis 4/5 with no pain  Right piriformis 4/5 with no pain    Squat:  Shift to right  Foot flare to right  Arm drop on left    Lunge:  Right knee genu valgum  Right knee cross over     NAL:  Restricted SI on right side    Ortho:  SLR (-), fader (pinching right hip), liliam (-), bounce home (-), Lars (-)    Assessment:  NAL with associated myofascitis and weakness  Hip pain, discussed potential labral pathology and AURELIA  Knee pain    Plan:  Patient tolerated treatment well today  Treatment Time: 45 minutes  33617 manipulation 1-2 segments: MET, Hip LAD  66585 Manual therapy: (ART, Graston, Strain Counter Strain, Fascial Manipulation, Cupping) performed over area of R hip ER, L VL, L popliteus , L calf, L hamstring  00201 therapeutic exercise (20 minutes): hip reverse clams X40 yellow band, side lying hip abduction, straight leg marching bridges, hot salsa reach lunges,  3 way mobility sequence   Standing single leg hip IR swivel with green band, prone quad single leg IR swivel, side lying hip abduction against wall, single leg step back   Today: quad stretching, review or current exercises   Strapping: hip IR and inferior distraction, kneeling posterior glide back and forth, figure 4 lateral glide  Taping:  Plan: 4 visits over next 6 weeks  Next visit: 2 week  Shock wave: right distal quad 15/5   Dry Needle: L popljus Live DC, MEd, ATC

## 2018-07-02 ENCOUNTER — THERAPY VISIT (OUTPATIENT)
Dept: CHIROPRACTIC MEDICINE | Facility: CLINIC | Age: 23
End: 2018-07-02

## 2018-07-02 DIAGNOSIS — M99.05 SOMATIC DYSFUNCTION OF PELVIS REGION: ICD-10-CM

## 2018-07-02 DIAGNOSIS — M25.562 LEFT KNEE PAIN, UNSPECIFIED CHRONICITY: ICD-10-CM

## 2018-07-02 DIAGNOSIS — M25.551 HIP PAIN, RIGHT: ICD-10-CM

## 2018-07-02 DIAGNOSIS — M79.10 MYALGIA: ICD-10-CM

## 2018-07-02 PROCEDURE — 98940 CHIROPRACT MANJ 1-2 REGIONS: CPT | Mod: AT | Performed by: CHIROPRACTOR

## 2018-07-02 PROCEDURE — 97110 THERAPEUTIC EXERCISES: CPT | Performed by: CHIROPRACTOR

## 2018-07-02 NOTE — PROGRESS NOTES
[facility]    Subjective:  Wei Alfaro   22 year old   male    CC: Left posterior knee pain, R posterior hip pain,    Medications reviewed: allergy medication   Visit: 2  Goal: play high level baseball, decrease pain 50%, sit for 30 minutes without hip pain, run 30 minutes without knee pain  Location: L posterior knee, R Posterior hip   Comes in today doing well. Tolerated last session well land working on active care program. Overall feeling very good. We talked about moving appointments out as feeling good and he agreed to this. He denies any new issues.     Objective:  Inspection:  No SDD  No Scars  Normal Gait  Right foot flare     Palpation:  No specific pain  Palpable soreness over L posterior knee, R posterior hip  Myofascitis 2/4 noted over L popliteus, R hip ER, L distal hamstring    ROM:  Lumbar flexion   90/90  Lumbar extension  30/30, no pain  Right Hip IR  30/45,   Left Hip IR  32/45  Right Hip ER  58/60  Left  hip ER  52/60  Ankle DF limited B  SLR full with tightness noted over left posterior knee  Able to deep squat with no pain    MMT:  Left glute med 5/5 with no pain  Right glute med 4/5 with no pain  Left TFL 5/5 with no pain  Right TFL 4/5 with no pain  Left piriformis 4/5 with no pain  Right piriformis 4/5 with no pain    Squat:  Shift to right  Foot flare to right  Arm drop on left    Lunge:  Right knee genu valgum  Right knee cross over     NAL:  Restricted SI on right side    Ortho:  SLR (-), fader (pinching right hip), liliam (-), bounce home (-), Lars (-)    Assessment:  NAL with associated myofascitis and weakness  Hip pain, discussed potential labral pathology and AURELIA  Knee pain    Plan:  Patient tolerated treatment well today  Treatment Time: 45 minutes  92584 manipulation 1-2 segments: MET, Hip LAD  91432 Manual therapy: (ART, Graston, Strain Counter Strain, Fascial Manipulation, Cupping) performed over area of R hip ER, L VL, L popliteus , L  calf, L hamstring  44028 therapeutic exercise (20 minutes): hip reverse clams X40 yellow band, side lying hip abduction, straight leg marching bridges, hot salsa reach lunges, 3 way mobility sequence   Standing single leg hip IR swivel with green band, prone quad single leg IR swivel, side lying hip abduction against wall, single leg step back   Today: quad stretching, review or current exercises   Strapping: hip IR and inferior distraction, kneeling posterior glide back and forth, figure 4 lateral glide  Taping:  Plan: 4 visits over next 6 weeks  Next visit: PRN as doing well with active care program  Shock wave: right distal quad 15/5      Donald Live DC, MEd, ATC

## 2020-03-01 ENCOUNTER — HEALTH MAINTENANCE LETTER (OUTPATIENT)
Age: 25
End: 2020-03-01

## 2020-08-25 ENCOUNTER — NURSE TRIAGE (OUTPATIENT)
Dept: NURSING | Facility: CLINIC | Age: 25
End: 2020-08-25

## 2020-08-25 NOTE — TELEPHONE ENCOUNTER
[place] and is asymptomatic. He was hoping to get a covid test by tomorrow. I gave him the information about on[facility] and talking to his primary about getting a test done. He does have a test scheduled for Friday.     Georgette Cohen RN/ [facility]        Reason for Disposition    [1] COVID-19 EXPOSURE (Close Contact) AND [2] within last 14 days BUT [3] NO symptoms    Protocols used: CORONAVIRUS (COVID-19) EXPOSURE-A- 5.16.20

## 2020-12-14 ENCOUNTER — HEALTH MAINTENANCE LETTER (OUTPATIENT)
Age: 25
End: 2020-12-14

## 2021-04-17 ENCOUNTER — HEALTH MAINTENANCE LETTER (OUTPATIENT)
Age: 26
End: 2021-04-17

## 2021-10-02 ENCOUNTER — HEALTH MAINTENANCE LETTER (OUTPATIENT)
Age: 26
End: 2021-10-02

## 2021-11-29 ENCOUNTER — NURSE TRIAGE (OUTPATIENT)
Dept: NURSING | Facility: CLINIC | Age: 26
End: 2021-11-29

## 2021-11-29 NOTE — TELEPHONE ENCOUNTER
Did a (three) home covid tests. All were positive.  Started feeling under the weather on 11/25/21. Sinus pressure, runny nose and dry throat.  Asking about length of time to quarantine.  Asked too about reporting this to [facility].  Questions answered.    COVID 19 Nurse Triage Plan/Patient Instructions    Please be aware that novel coronavirus (COVID-19) may be circulating in the community. If you develop symptoms such as fever, cough, or SOB or if you have concerns about the presence of another infection including coronavirus (COVID-19), please contact your health care provider or visit [email or web address].     Disposition/Instructions    Home care recommended. Follow home care protocol based instructions.    Thank you for taking steps to prevent the spread of this virus.  o Limit your contact with others.  o Wear a simple mask to cover your cough.  o Wash your hands well and often.    Resources    [facility]: About COVID-19: [email or web address]    CDC: What to Do If You're Sick: www.cdc.gov/coronavirus/2019-ncov/about/steps-when-sick.html    CDC: Ending Home Isolation: www.cdc.gov/coronavirus/2019-ncov/hcp/disposition-in-home-patients.html     CDC: Caring for Someone: www.cdc.gov/coronavirus/2019-ncov/if-you-are-sick/care-for-someone.html     [facility]: Interim Guidance for Hospital Discharge to Home: [email or web address]/[email or web address]/hcp/[email or web address]    [facility] clinical trials (COVID-19 research studies): [email or web address]     Below are the COVID-19 hotlines at the [facility] of Health ([facility]). Interpreters are available.   o For health questions: Call 179-225-3870 or 1-464.412.6898 (7 a.m. to 7 p.m.)  o For questions about schools and childcare: Call 478-557-6957 or 1-425.413.1468 (7 a.m. to 7 p.m.)       Reason for Disposition    [1] COVID-19 diagnosed by positive lab test AND [2] mild symptoms (e.g., cough, fever, others) AND [3]  no complications or SOB    Additional Information    Negative: SEVERE difficulty breathing (e.g., struggling for each breath, speaks in single words)    Negative: Difficult to awaken or acting confused (e.g., disoriented, slurred speech)    Negative: Bluish (or gray) lips or face now    Negative: Shock suspected (e.g., cold/pale/clammy skin, too weak to stand, low BP, rapid pulse)    Negative: Sounds like a life-threatening emergency to the triager    Negative: SEVERE or constant chest pain or pressure (Exception: mild central chest pain, present only when coughing)    Negative: MODERATE difficulty breathing (e.g., speaks in phrases, SOB even at rest, pulse 100-120)    Negative: [1] Headache AND [2] stiff neck (can't touch chin to chest)    Negative: MILD difficulty breathing (e.g., minimal/no SOB at rest, SOB with walking, pulse <100)    Negative: Chest pain or pressure    Negative: Patient sounds very sick or weak to the triager    Negative: Fever > 103 F (39.4 C)    Negative: [1] Fever > 101 F (38.3 C) AND [2] age > 60 years    Negative: [1] Fever > 100.0 F (37.8 C) AND [2] bedridden (e.g., nursing home patient, CVA, chronic illness, recovering from surgery)    Negative: HIGH RISK for severe COVID complications (e.g., age > 64 years, obesity with BMI > 25, pregnant, chronic lung disease or other chronic medical condition)  (Exception: Already seen by PCP and no new or worsening symptoms.)    Negative: [1] HIGH RISK patient AND [2] influenza is widespread in the community AND [3] ONE OR MORE respiratory symptoms: cough, sore throat, runny or stuffy nose    Negative: [1] HIGH RISK patient AND [2] influenza exposure within the last 7 days AND [3] ONE OR MORE respiratory symptoms: cough, sore throat, runny or stuffy nose    Negative: [1] COVID-19 infection suspected by caller or triager AND [2] mild symptoms (cough, fever, or others) AND [3] negative COVID-19 rapid test    Negative: Fever present > 3 days (72  hours)    Negative: [1] Fever returns after gone for over 24 hours AND [2] symptoms worse or not improved    Negative: [1] Continuous (nonstop) coughing interferes with work or school AND [2] no improvement using cough treatment per protocol    Negative: Cough present > 3 weeks    Negative: [1] COVID-19 diagnosed by positive lab test AND [2] NO symptoms (e.g., cough, fever, others)    Protocols used: CORONAVIRUS (COVID-19) DIAGNOSED OR [id number] 8.25.2021    Kateryna ESTRADA RN [facility]

## 2022-05-08 ENCOUNTER — HEALTH MAINTENANCE LETTER (OUTPATIENT)
Age: 27
End: 2022-05-08

## 2023-01-14 ENCOUNTER — HEALTH MAINTENANCE LETTER (OUTPATIENT)
Age: 28
End: 2023-01-14

## 2023-06-02 ENCOUNTER — HEALTH MAINTENANCE LETTER (OUTPATIENT)
Age: 28
End: 2023-06-02